# Patient Record
Sex: FEMALE
[De-identification: names, ages, dates, MRNs, and addresses within clinical notes are randomized per-mention and may not be internally consistent; named-entity substitution may affect disease eponyms.]

---

## 2019-04-29 ENCOUNTER — HOSPITAL ENCOUNTER (OUTPATIENT)
Dept: HOSPITAL 53 - M RAD | Age: 24
End: 2019-04-29
Attending: ADVANCED PRACTICE MIDWIFE
Payer: COMMERCIAL

## 2019-04-29 DIAGNOSIS — Z36.89: ICD-10-CM

## 2019-04-29 DIAGNOSIS — Z3A.21: ICD-10-CM

## 2019-04-29 DIAGNOSIS — Z34.82: Primary | ICD-10-CM

## 2019-04-30 NOTE — REP
Clinical:  Anatomical evaluation.

 

Comparison: None.

 

Findings:

Examination demonstrates a single live intrauterine pregnancy in variable

presentation.  Fetal motion is identified by technologist.  Placenta is noted

right fundal and grade grade zero without evidence for placenta previa or

abruption.  Amniotic fluid volume is normal.  Cervix measures 3.4 cm in length

and appears closed.  No evidence for nuchal cord.

 

Gestational age by LMP 21 weeks 2 days with [facility] 09/07/2019 .

Gestational age by current measurements 21 weeks 6 days with [facility] 09/03/2019 .

 

FHR equals 150 beats per minute.

BPD  5.4 cm     22 weeks 4 days

HC  19.5 cm     21 weeks 5 days

AC  17.2 cm     22 weeks 1 day

FL  3.5 cm      21 weeks 0 days

HL  3.5 cm      21 weeks 6 days

HC/AC ratio  1.13

 

Estimated fetal weight 442 grams (58th percentile).

 

Anatomical assessment demonstrates normal structures including cranium, choroid

plexus, cavum, cerebellum/posterior fossa, facial features, lungs, four-chamber

heart/ventricular outflow tracts, diaphragm, stomach, cord insertion/three-vessel

cord, kidneys/bladder, spine, and extremities.

 

Impression:

Single live intrauterine pregnancy in variable presentation demonstrating

appropriate interval growth.  Anatomical assessment is complete and normal.  No

gross abnormalities are identified.

 

 

Electronically Signed by

Axel Bain MD 04/30/2019 02:42 A

## 2019-06-05 ENCOUNTER — HOSPITAL ENCOUNTER (OUTPATIENT)
Dept: HOSPITAL 53 - M LAB REF | Age: 24
End: 2019-06-05
Attending: ADVANCED PRACTICE MIDWIFE
Payer: COMMERCIAL

## 2019-06-05 DIAGNOSIS — Z34.82: Primary | ICD-10-CM

## 2019-06-21 ENCOUNTER — HOSPITAL ENCOUNTER (OUTPATIENT)
Dept: HOSPITAL 53 - M SMT | Age: 24
End: 2019-06-21
Attending: ADVANCED PRACTICE MIDWIFE
Payer: COMMERCIAL

## 2019-06-21 DIAGNOSIS — O99.89: Primary | ICD-10-CM

## 2019-06-21 DIAGNOSIS — Z3A.26: ICD-10-CM

## 2019-06-21 LAB
HCT VFR BLD AUTO: 34.7 % (ref 36–47)
HGB BLD-MCNC: 11.5 G/DL (ref 12–15.5)
MCH RBC QN AUTO: 30.1 PG (ref 27–33)
MCHC RBC AUTO-ENTMCNC: 33.1 G/DL (ref 32–36.5)
MCV RBC AUTO: 90.8 FL (ref 80–96)
PLATELET # BLD AUTO: 298 10^3/UL (ref 150–450)
RBC # BLD AUTO: 3.82 10^6/UL (ref 4–5.4)
WBC # BLD AUTO: 7.8 10^3/UL (ref 4–10)

## 2019-06-27 ENCOUNTER — HOSPITAL ENCOUNTER (OUTPATIENT)
Dept: HOSPITAL 53 - M LAB REF | Age: 24
End: 2019-06-27
Attending: ADVANCED PRACTICE MIDWIFE
Payer: COMMERCIAL

## 2019-06-27 DIAGNOSIS — Z3A.00: ICD-10-CM

## 2019-06-27 DIAGNOSIS — Z34.82: Primary | ICD-10-CM

## 2019-07-03 ENCOUNTER — HOSPITAL ENCOUNTER (OUTPATIENT)
Dept: HOSPITAL 53 - M RAD | Age: 24
End: 2019-07-03
Attending: ADVANCED PRACTICE MIDWIFE
Payer: COMMERCIAL

## 2019-07-03 DIAGNOSIS — O26.843: Primary | ICD-10-CM

## 2019-07-03 DIAGNOSIS — Z3A.33: ICD-10-CM

## 2019-07-03 NOTE — REP
Clinical:  Growth evaluation.

 

Comparison: 04/29/2019 .

 

Findings:

Examination demonstrates a single live intrauterine pregnancy in cephalic

presentation.  Fetal motion is identified by technologist.  Placenta is noted

anterior and grade one without evidence for placenta previa or abruption.

Amniotic fluid volume is elevated.  Cervix measures 3.2 cm in length and appears

closed.  No evidence for nuchal cord.

 

Gestational age by LMP 30 weeks 4 days with [facility] 09/07/2019 .

Gestational age by current measurements 33 weeks 6 days with [facility] 08/15/2019 .

 

FHR equals 145 beats per minute.

BPD  8.7 cm     35 weeks 1 day

HC  30.8 cm     34 weeks 2 days

AC  30.4 cm     34 weeks 2 days

FL  6.3 cm      32 weeks 3 days

HL  5.6 cm      32 weeks 5 days

HC/AC ratio  1.01

 

Estimated fetal weight 2290 grams ( greater than 97 percentile ).

Amniotic fluid index:  30.6 cm (8.9 - 23.6)

Umbilical cord SD ratio:  2.50 (2.50 - 3.50).

 

Limited anatomical assessment demonstrates normal cavum, facial features, lungs,

four-chamber heart, diaphragm, stomach, cord insertion/three-vessel cord,

kidneys/bladder and spine.

 

Impression:

1.  Based on age by LMP, there has been greater than expected interval growth

with the estimated fetal weight greater than 97 percentile.

2.  Amniotic fluid volume is above normal range suggesting polyhydramnios.

 

 

Electronically Signed by

Axel Bain MD 07/03/2019 04:48 P

## 2019-07-11 ENCOUNTER — HOSPITAL ENCOUNTER (OUTPATIENT)
Dept: HOSPITAL 53 - M SMT | Age: 24
End: 2019-07-11
Attending: ADVANCED PRACTICE MIDWIFE
Payer: COMMERCIAL

## 2019-07-11 DIAGNOSIS — O16.3: Primary | ICD-10-CM

## 2019-07-11 DIAGNOSIS — Z3A.00: ICD-10-CM

## 2019-07-11 LAB
ALT SERPL W P-5'-P-CCNC: 26 U/L (ref 12–78)
BILIRUB SERPL-MCNC: 0.4 MG/DL (ref 0.2–1)
CREAT SERPL-MCNC: 0.47 MG/DL (ref 0.55–1.3)
CREAT UR-MCNC: 83.3 MG/DL
GFR SERPL CREATININE-BSD FRML MDRD: > 60 ML/MIN/{1.73_M2} (ref 60–?)
HCT VFR BLD AUTO: 36.4 % (ref 36–47)
HGB BLD-MCNC: 11.9 G/DL (ref 12–15.5)
LDH SERPL L TO P-CCNC: 156 U/L (ref 84–246)
MCH RBC QN AUTO: 29.5 PG (ref 27–33)
MCHC RBC AUTO-ENTMCNC: 32.7 G/DL (ref 32–36.5)
MCV RBC AUTO: 90.1 FL (ref 80–96)
PLATELET # BLD AUTO: 276 10^3/UL (ref 150–450)
PROT UR-MCNC: 18.3 MG/DL (ref 0–12)
RBC # BLD AUTO: 4.04 10^6/UL (ref 4–5.4)
URATE SERPL-MCNC: 2.6 MG/DL (ref 2.6–6)
WBC # BLD AUTO: 8.8 10^3/UL (ref 4–10)

## 2019-07-18 ENCOUNTER — HOSPITAL ENCOUNTER (OUTPATIENT)
Dept: HOSPITAL 53 - M RAD | Age: 24
End: 2019-07-18
Attending: ADVANCED PRACTICE MIDWIFE
Payer: COMMERCIAL

## 2019-07-18 DIAGNOSIS — O13.3: Primary | ICD-10-CM

## 2019-07-18 NOTE — REP
OB ULTRASOUND, BIOPHYSICAL PROFILE:

 

Real-time sonographic evaluation of the gravid uterus is performed.

 

There is a single living intrauterine gestation.  The estimated gestational age

is 32 weeks 5 days. EDC 09/07/2019. Cervix is closed and measures 3.3 cm in

length.  Fetal heart rate 136 beats per minute.  Amniotic fluid subjectively is

upper limits of normal. ROSALVA 25.3 which is slightly above normal range of 8.4 to

24.4. Biophysical profile score 8/8. SD ratio 2.36 within normal range of 2.15 to

3.15. RI 0.58 is slightly below normal range of 0.59 to 0.75. Fetal position is

vertex. Placenta is anterior an fundal, grade 1 with no previa or abruption.

 

 

Electronically Signed by

Camron Araya MD 07/21/2019 07:20 P

## 2019-07-20 ENCOUNTER — HOSPITAL ENCOUNTER (OUTPATIENT)
Dept: HOSPITAL 53 - M LDO | Age: 24
Discharge: HOME | End: 2019-07-20
Attending: ADVANCED PRACTICE MIDWIFE
Payer: COMMERCIAL

## 2019-07-20 VITALS — HEIGHT: 66 IN | BODY MASS INDEX: 34.51 KG/M2 | WEIGHT: 214.73 LBS

## 2019-07-20 VITALS — SYSTOLIC BLOOD PRESSURE: 112 MMHG | DIASTOLIC BLOOD PRESSURE: 58 MMHG

## 2019-07-20 VITALS — SYSTOLIC BLOOD PRESSURE: 108 MMHG | DIASTOLIC BLOOD PRESSURE: 62 MMHG

## 2019-07-20 VITALS — SYSTOLIC BLOOD PRESSURE: 118 MMHG | DIASTOLIC BLOOD PRESSURE: 56 MMHG

## 2019-07-20 VITALS — DIASTOLIC BLOOD PRESSURE: 67 MMHG | SYSTOLIC BLOOD PRESSURE: 108 MMHG

## 2019-07-20 DIAGNOSIS — R51: ICD-10-CM

## 2019-07-20 DIAGNOSIS — Z3A.33: ICD-10-CM

## 2019-07-20 DIAGNOSIS — O99.89: Primary | ICD-10-CM

## 2019-07-20 LAB
ALT SERPL W P-5'-P-CCNC: 21 U/L (ref 12–78)
BILIRUB SERPL-MCNC: 0.3 MG/DL (ref 0.2–1)
CREAT SERPL-MCNC: 0.49 MG/DL (ref 0.55–1.3)
CREAT UR-MCNC: 35.5 MG/DL
GFR SERPL CREATININE-BSD FRML MDRD: > 60 ML/MIN/{1.73_M2} (ref 60–?)
HCT VFR BLD AUTO: 31.3 % (ref 36–47)
HGB BLD-MCNC: 10.7 G/DL (ref 12–15.5)
LDH SERPL L TO P-CCNC: 160 U/L (ref 84–246)
MCH RBC QN AUTO: 30.1 PG (ref 27–33)
MCHC RBC AUTO-ENTMCNC: 34.2 G/DL (ref 32–36.5)
MCV RBC AUTO: 87.9 FL (ref 80–96)
PLATELET # BLD AUTO: 238 10^3/UL (ref 150–450)
PROT UR-MCNC: 6.2 MG/DL (ref 0–12)
RBC # BLD AUTO: 3.56 10^6/UL (ref 4–5.4)
URATE SERPL-MCNC: 3.2 MG/DL (ref 2.6–6)
WBC # BLD AUTO: 7.2 10^3/UL (ref 4–10)

## 2019-07-20 PROCEDURE — 82570 ASSAY OF URINE CREATININE: CPT

## 2019-07-20 PROCEDURE — 85027 COMPLETE CBC AUTOMATED: CPT

## 2019-07-20 PROCEDURE — 82247 BILIRUBIN TOTAL: CPT

## 2019-07-20 PROCEDURE — 84450 TRANSFERASE (AST) (SGOT): CPT

## 2019-07-20 PROCEDURE — 36415 COLL VENOUS BLD VENIPUNCTURE: CPT

## 2019-07-20 PROCEDURE — 82565 ASSAY OF CREATININE: CPT

## 2019-07-20 PROCEDURE — 84550 ASSAY OF BLOOD/URIC ACID: CPT

## 2019-07-20 PROCEDURE — 84156 ASSAY OF PROTEIN URINE: CPT

## 2019-07-20 PROCEDURE — 83615 LACTATE (LD) (LDH) ENZYME: CPT

## 2019-07-20 PROCEDURE — 84460 ALANINE AMINO (ALT) (SGPT): CPT

## 2019-07-20 PROCEDURE — 59025 FETAL NON-STRESS TEST: CPT

## 2019-07-20 NOTE — IPNPDOC
Text Note


Date of Service


The patient was seen on 19.





NOTE


24yo  [facility] 19. Presents @ 33wks with reports of headache. State is 

unilateral, Right sided over her eye and radiates into her neck. Denies visual 

disturbances, CP. Reports taking tylenol in the afternoon and again around 0100 

without relief. 





History is significant for GHTN


Resting in bed, NAD


Cat I fetal tracing


/58. Pressure yesterday in the office 128/68





Will repeat preeclamptic panel


Fioricet ordered.





VS,Fishbone, I+O


VS, Fishbone, I+O





Vital Signs








  Date Time  Temp Pulse Resp B/P (MAP) Pulse Ox O2 Delivery O2 Flow Rate FiO2


 


19 03:58 98.2 81 18 112/58 (76)    

















Harriet Milian CNM   2019 04:39

## 2019-07-23 ENCOUNTER — HOSPITAL ENCOUNTER (OUTPATIENT)
Dept: HOSPITAL 53 - M LDO | Age: 24
Discharge: HOME | End: 2019-07-23
Attending: OBSTETRICS & GYNECOLOGY
Payer: COMMERCIAL

## 2019-07-23 VITALS — DIASTOLIC BLOOD PRESSURE: 76 MMHG | SYSTOLIC BLOOD PRESSURE: 134 MMHG

## 2019-07-23 VITALS — DIASTOLIC BLOOD PRESSURE: 76 MMHG | SYSTOLIC BLOOD PRESSURE: 126 MMHG

## 2019-07-23 VITALS — SYSTOLIC BLOOD PRESSURE: 115 MMHG | DIASTOLIC BLOOD PRESSURE: 68 MMHG

## 2019-07-23 VITALS — DIASTOLIC BLOOD PRESSURE: 75 MMHG | SYSTOLIC BLOOD PRESSURE: 125 MMHG

## 2019-07-23 VITALS — SYSTOLIC BLOOD PRESSURE: 119 MMHG | DIASTOLIC BLOOD PRESSURE: 71 MMHG

## 2019-07-23 VITALS — DIASTOLIC BLOOD PRESSURE: 68 MMHG | SYSTOLIC BLOOD PRESSURE: 116 MMHG

## 2019-07-23 VITALS — SYSTOLIC BLOOD PRESSURE: 129 MMHG | DIASTOLIC BLOOD PRESSURE: 79 MMHG

## 2019-07-23 VITALS — BODY MASS INDEX: 34.9 KG/M2 | WEIGHT: 217.16 LBS | HEIGHT: 66 IN

## 2019-07-23 VITALS — SYSTOLIC BLOOD PRESSURE: 75 MMHG

## 2019-07-23 DIAGNOSIS — O26.893: Primary | ICD-10-CM

## 2019-07-23 DIAGNOSIS — R03.0: ICD-10-CM

## 2019-07-23 DIAGNOSIS — Z3A.33: ICD-10-CM

## 2019-07-23 PROCEDURE — 59025 FETAL NON-STRESS TEST: CPT

## 2019-07-25 ENCOUNTER — HOSPITAL ENCOUNTER (OUTPATIENT)
Dept: HOSPITAL 53 - M RAD | Age: 24
End: 2019-07-25
Attending: ADVANCED PRACTICE MIDWIFE
Payer: COMMERCIAL

## 2019-07-25 DIAGNOSIS — O40.3XX0: Primary | ICD-10-CM

## 2019-07-25 DIAGNOSIS — Z3A.33: ICD-10-CM

## 2019-07-29 NOTE — REP
Clinical:  Fetal well-being.  Polyhydramnios.

 

Comparison: 07/18/2019 .

 

Findings:

Examination demonstrates a single live intrauterine pregnancy in cephalic

presentation.  Fetal motion is identified by technologist.  Placenta is noted

anterior/fundal and grade one without evidence for placenta previa or abruption.

 

 

Gestational age by LMP 33 weeks 5 days with [facility] 09/07/2019 .

FHR equals 133 beats per minute.

Amniotic fluid index:  19.8 cm (8.2 - 24.7)

Biophysical profile score: 8/8

Umbilical cord SD ratio:  1.98

 

 

 

Impression:

1.  Single live intrauterine pregnancy in cephalic presentation.

2.  Biophysical profile score is normal.

3.  Amniotic fluid volume is within normal range.

 

 

Electronically Signed by

Axel Bain MD 07/29/2019 01:00 P

## 2019-08-01 ENCOUNTER — HOSPITAL ENCOUNTER (OUTPATIENT)
Dept: HOSPITAL 53 - M RAD | Age: 24
End: 2019-08-01
Attending: ADVANCED PRACTICE MIDWIFE
Payer: COMMERCIAL

## 2019-08-01 DIAGNOSIS — O40.3XX0: Primary | ICD-10-CM

## 2019-08-01 NOTE — REP
Clinical:  Fetal well-being

 

Comparison: 07/25/2019 .

 

Findings:

Examination demonstrates a single live intrauterine pregnancy in cephalic

presentation.  Fetal motion is identified by technologist.  Placenta is noted

anterior/fundal and grade II without evidence for placenta previa or abruption.

Amniotic fluid volume is normal.  Cervix measures 2.7 cm in length and appears

closed.  No evidence for nuchal cord.

 

Gestational age by LMP 34 weeks 5 days with [facility] 09/07/2019 .

FHR equals 158 beats per minute.

Biophysical profile score:  8/8

Amniotic fluid index:  18.6 cm (8.0 - 24.9)

 

 

 

 

 

Impression:

Single live advanced gestation in cephalic presentation.

Biophysical profile score and amniotic fluid volume are within normal limits.

 

 

Electronically Signed by

Axel Bain MD 08/01/2019 12:36 P

## 2019-08-07 ENCOUNTER — HOSPITAL ENCOUNTER (OUTPATIENT)
Dept: HOSPITAL 53 - M RAD | Age: 24
End: 2019-08-07
Attending: ADVANCED PRACTICE MIDWIFE
Payer: COMMERCIAL

## 2019-08-07 DIAGNOSIS — Z3A.37: ICD-10-CM

## 2019-08-07 DIAGNOSIS — O13.3: Primary | ICD-10-CM

## 2019-08-08 ENCOUNTER — HOSPITAL ENCOUNTER (OUTPATIENT)
Dept: HOSPITAL 53 - M SMT | Age: 24
End: 2019-08-08
Attending: ADVANCED PRACTICE MIDWIFE
Payer: COMMERCIAL

## 2019-08-08 ENCOUNTER — HOSPITAL ENCOUNTER (OUTPATIENT)
Dept: HOSPITAL 53 - M LAB REF | Age: 24
End: 2019-08-08
Attending: ADVANCED PRACTICE MIDWIFE
Payer: COMMERCIAL

## 2019-08-08 DIAGNOSIS — O13.3: Primary | ICD-10-CM

## 2019-08-14 ENCOUNTER — HOSPITAL ENCOUNTER (OUTPATIENT)
Dept: HOSPITAL 53 - M RAD | Age: 24
End: 2019-08-14
Attending: ADVANCED PRACTICE MIDWIFE
Payer: COMMERCIAL

## 2019-08-14 DIAGNOSIS — Z3A.00: ICD-10-CM

## 2019-08-14 DIAGNOSIS — O40.3XX0: Primary | ICD-10-CM

## 2019-08-14 NOTE — REP
OBSTETRIC SONOGRAPHY:  LIMITED EXAM.

 

HISTORY :  Weekly biophysical profile.

 

FINDINGS:  Scanning demonstrates a viable single intrauterine gestation in a

cephalic fetal lie.  An anterior fundal placenta is seen without evidence of

previa or abruption, grade 2-3 changes.  Umbilical cord is seen draping across

fetal shoulders.

 

Closed cervical length is measured transabdominally at 4.1 cm.  Fetal heart rate

is recorded at 152 beats per minute.  Amniotic fluid is subjectively normal.  ROSALVA

is normal at 15.6 cm.  Biophysical profile score is 8 out of a possible 8.  S/D

ratio in the umbilical cord artery by Doppler is normal 1.90.

 

 

Electronically Signed by

Jose Lowe MD 08/14/2019 09:03 P

## 2019-08-21 ENCOUNTER — HOSPITAL ENCOUNTER (OUTPATIENT)
Dept: HOSPITAL 53 - M RAD | Age: 24
End: 2019-08-21
Attending: ADVANCED PRACTICE MIDWIFE
Payer: COMMERCIAL

## 2019-08-21 DIAGNOSIS — Z3A.38: ICD-10-CM

## 2019-08-21 DIAGNOSIS — O40.3XX0: Primary | ICD-10-CM

## 2019-08-21 NOTE — REP
Third trimester obstetric ultrasound for fetal biophysical profile:

There is a single intrauterine gestation in a vertex presentation.

The fetal heart rate is 152 beats per minute.

Gestational age by the first ultrasound is 38 weeks 1 day/[facility] 09/03/2019.

Gestational age by LMP is 37 weeks 4 days/[facility] 09/07/2019.

The placenta is fundal without previa or abruptio and demonstrates grade 3

maturity.

 

The biophysical profile:

Breathing         2.0

Movement    2.0

Tone         2.0

AFV         2.0

 

Total 8.0 / 8.0.

 

Umbilical artery Doppler assessment:

S/D ratio         1.9 (2.30-3.30)

 

 

 

 

Electronically Signed by

Camron Kerns MD 08/21/2019 12:18 P

## 2019-08-22 ENCOUNTER — HOSPITAL ENCOUNTER (INPATIENT)
Dept: HOSPITAL 53 - M LDI | Age: 24
LOS: 2 days | Discharge: HOME | End: 2019-08-24
Payer: COMMERCIAL

## 2019-08-22 VITALS — DIASTOLIC BLOOD PRESSURE: 89 MMHG | SYSTOLIC BLOOD PRESSURE: 144 MMHG

## 2019-08-22 VITALS — DIASTOLIC BLOOD PRESSURE: 85 MMHG | SYSTOLIC BLOOD PRESSURE: 138 MMHG

## 2019-08-22 VITALS — DIASTOLIC BLOOD PRESSURE: 84 MMHG | SYSTOLIC BLOOD PRESSURE: 133 MMHG

## 2019-08-22 VITALS — DIASTOLIC BLOOD PRESSURE: 70 MMHG | SYSTOLIC BLOOD PRESSURE: 134 MMHG

## 2019-08-22 VITALS — SYSTOLIC BLOOD PRESSURE: 119 MMHG | DIASTOLIC BLOOD PRESSURE: 71 MMHG

## 2019-08-22 VITALS — SYSTOLIC BLOOD PRESSURE: 137 MMHG | DIASTOLIC BLOOD PRESSURE: 67 MMHG

## 2019-08-22 VITALS — DIASTOLIC BLOOD PRESSURE: 77 MMHG | SYSTOLIC BLOOD PRESSURE: 133 MMHG

## 2019-08-22 VITALS — DIASTOLIC BLOOD PRESSURE: 80 MMHG | SYSTOLIC BLOOD PRESSURE: 137 MMHG

## 2019-08-22 VITALS — DIASTOLIC BLOOD PRESSURE: 66 MMHG | SYSTOLIC BLOOD PRESSURE: 129 MMHG

## 2019-08-22 VITALS — DIASTOLIC BLOOD PRESSURE: 75 MMHG | SYSTOLIC BLOOD PRESSURE: 132 MMHG

## 2019-08-22 VITALS — SYSTOLIC BLOOD PRESSURE: 153 MMHG | DIASTOLIC BLOOD PRESSURE: 82 MMHG

## 2019-08-22 VITALS — SYSTOLIC BLOOD PRESSURE: 140 MMHG | DIASTOLIC BLOOD PRESSURE: 88 MMHG

## 2019-08-22 VITALS — SYSTOLIC BLOOD PRESSURE: 135 MMHG | DIASTOLIC BLOOD PRESSURE: 76 MMHG

## 2019-08-22 VITALS — DIASTOLIC BLOOD PRESSURE: 81 MMHG | SYSTOLIC BLOOD PRESSURE: 129 MMHG

## 2019-08-22 VITALS — HEIGHT: 66 IN | BODY MASS INDEX: 35.32 KG/M2 | WEIGHT: 219.8 LBS

## 2019-08-22 VITALS — DIASTOLIC BLOOD PRESSURE: 67 MMHG | SYSTOLIC BLOOD PRESSURE: 120 MMHG

## 2019-08-22 VITALS — SYSTOLIC BLOOD PRESSURE: 164 MMHG | DIASTOLIC BLOOD PRESSURE: 87 MMHG

## 2019-08-22 VITALS — SYSTOLIC BLOOD PRESSURE: 149 MMHG | DIASTOLIC BLOOD PRESSURE: 74 MMHG

## 2019-08-22 VITALS — DIASTOLIC BLOOD PRESSURE: 89 MMHG | SYSTOLIC BLOOD PRESSURE: 132 MMHG

## 2019-08-22 VITALS — SYSTOLIC BLOOD PRESSURE: 169 MMHG | DIASTOLIC BLOOD PRESSURE: 91 MMHG

## 2019-08-22 VITALS — DIASTOLIC BLOOD PRESSURE: 88 MMHG | SYSTOLIC BLOOD PRESSURE: 130 MMHG

## 2019-08-22 VITALS — SYSTOLIC BLOOD PRESSURE: 128 MMHG | DIASTOLIC BLOOD PRESSURE: 78 MMHG

## 2019-08-22 VITALS — DIASTOLIC BLOOD PRESSURE: 63 MMHG | SYSTOLIC BLOOD PRESSURE: 123 MMHG

## 2019-08-22 VITALS — DIASTOLIC BLOOD PRESSURE: 56 MMHG | SYSTOLIC BLOOD PRESSURE: 120 MMHG

## 2019-08-22 VITALS — DIASTOLIC BLOOD PRESSURE: 76 MMHG | SYSTOLIC BLOOD PRESSURE: 129 MMHG

## 2019-08-22 VITALS — DIASTOLIC BLOOD PRESSURE: 74 MMHG | SYSTOLIC BLOOD PRESSURE: 126 MMHG

## 2019-08-22 DIAGNOSIS — O40.3XX0: ICD-10-CM

## 2019-08-22 DIAGNOSIS — Z3A.37: ICD-10-CM

## 2019-08-22 LAB
HCT VFR BLD AUTO: 30.9 % (ref 36–47)
HGB BLD-MCNC: 10.3 G/DL (ref 12–15.5)
MCH RBC QN AUTO: 28.4 PG (ref 27–33)
MCHC RBC AUTO-ENTMCNC: 33.3 G/DL (ref 32–36.5)
MCV RBC AUTO: 85.1 FL (ref 80–96)
PLATELET # BLD AUTO: 195 10^3/UL (ref 150–450)
RBC # BLD AUTO: 3.63 10^6/UL (ref 4–5.4)
WBC # BLD AUTO: 8 10^3/UL (ref 4–10)

## 2019-08-22 PROCEDURE — 3E0P7GC INTRODUCTION OF OTHER THERAPEUTIC SUBSTANCE INTO FEMALE REPRODUCTIVE, VIA NATURAL OR ARTIFICIAL OPENING: ICD-10-PCS

## 2019-08-22 RX ADMIN — MISOPROSTOL SCH MCG: 100 TABLET ORAL at 12:23

## 2019-08-22 RX ADMIN — CALCIUM CARBONATE (ANTACID) CHEW TAB 500 MG PRN MG: 500 CHEW TAB at 11:07

## 2019-08-22 RX ADMIN — CALCIUM CARBONATE (ANTACID) CHEW TAB 500 MG PRN MG: 500 CHEW TAB at 16:52

## 2019-08-22 RX ADMIN — MISOPROSTOL SCH MCG: 100 TABLET ORAL at 08:16

## 2019-08-22 NOTE — HPE
DATE OF ADMISSION:  2019

 

23-year-old,  2, para 1 female at 37 and 5/7 weeks gestation by last

menstrual period plus ultrasound, estimated date of confinement (EDC) of

2019, presents for labor induction.  The indication for delivery less than

39 weeks is gestational hypertension.  She is not currently requiring medication

for blood pressure.

 

PRENATAL COURSE:  The patient initiated prenatal care at 10 weeks gestation on

2019.  Her initial blood pressure was 134/78, weight 207 pounds.  She had

intermittently mildly elevated pressures during pregnancy, including blood

pressure 150/90 at 32 weeks gestation. She did not require medication during

pregnancy.  She has polyhydramnios and suspected large for gestational age infant

on ultrasound evaluation.

 

OBSTETRICAL HISTORY:

1.  In 2016, 39 week vaginal delivery of 8 pounds 13 ounce female with no

complications.

 

SURGICAL HISTORY:  None.

 

MEDICAL HISTORY:  Hypertension.

 

ALLERGIES:  None.

 

SOCIAL HISTORY:   The patient is .  She denies cigarettes, alcohol or drug

use.

 

FAMILY HISTORY:   Noncontributory.

 

PHYSICAL EXAMINATION:

Blood pressure 134/80, pulse 84.  She is in no apparent distress.

Head and neck exam normal.

LUNGS:  Clear.

HEART:  Regular.

ABDOMEN:  Nontender.  Gravid.  Fetal heart tones category 1.

Sterile vaginal examination:  1 to 2 cm, 50%, -2, posterior, soft, vertex.

Contractions irregular.

EXTREMITIES:  Nontender.

 

LABORATORIES:

Blood type A positive, Rubella immune, RPR nonreactive, hepatitis B and C

negative, HIV negative.  Diabetes screen 96.

 

ASSESSMENT:

23-year-old  2, para 1 female at 37 and 5 with chronic hypertension who

presents for labor induction.

 

PLAN:  The patient was admitted on 2019. Risks of induction were discussed.

## 2019-08-23 VITALS — DIASTOLIC BLOOD PRESSURE: 76 MMHG | SYSTOLIC BLOOD PRESSURE: 127 MMHG

## 2019-08-23 VITALS — SYSTOLIC BLOOD PRESSURE: 135 MMHG | DIASTOLIC BLOOD PRESSURE: 73 MMHG

## 2019-08-23 VITALS — DIASTOLIC BLOOD PRESSURE: 92 MMHG | SYSTOLIC BLOOD PRESSURE: 143 MMHG

## 2019-08-23 RX ADMIN — SODIUM CHLORIDE, PRESERVATIVE FREE SCH ML: 5 INJECTION INTRAVENOUS at 13:23

## 2019-08-23 RX ADMIN — SODIUM CHLORIDE, PRESERVATIVE FREE SCH ML: 5 INJECTION INTRAVENOUS at 22:00

## 2019-08-23 RX ADMIN — Medication SCH TAB: at 08:18

## 2019-08-23 NOTE — DN
DATE:  2019

 

PREDELIVERY DIAGNOSIS:  39 weeks, labor induction.

 

POSTDELIVERY DIAGNOSIS:  Delivered.

 

PROCEDURE:  Spontaneous vaginal delivery.

 

OBSTETRICIAN:  Dr. Arpan Prieto

 

ANESTHESIA:  Epidural.

 

ESTIMATED BLOOD LOSS:

 

FINDINGS:  7 pounds 15 ounce male.  Apgar scores 8 and 9.

 

DELIVERY SUMMARY:  After a short second stage of 10 minutes, the patient

spontaneously delivered a 7 pound 15 ounce male with Apgar scores of 8 and 9

under epidural anesthesia.  There was no nuchal cord.  The shoulders delivered

with ease.  The infant cried immediately and was handed to the mother.  The cord

was doubly clamped and cut.  The placenta delivered spontaneously and appeared to

be intact.  The patient received IV Pitocin immediately after delivery of the

placenta.  A first degree perineal laceration did not necessitate any repair.

Sponge counts were correct.

## 2019-08-24 VITALS — SYSTOLIC BLOOD PRESSURE: 126 MMHG | DIASTOLIC BLOOD PRESSURE: 67 MMHG

## 2019-08-24 RX ADMIN — SODIUM CHLORIDE, PRESERVATIVE FREE SCH ML: 5 INJECTION INTRAVENOUS at 06:00

## 2019-08-24 RX ADMIN — Medication SCH TAB: at 08:37

## 2019-11-18 ENCOUNTER — HOSPITAL ENCOUNTER (OUTPATIENT)
Dept: HOSPITAL 53 - M LAB REF | Age: 24
End: 2019-11-18
Payer: COMMERCIAL

## 2019-11-18 DIAGNOSIS — Z12.4: Primary | ICD-10-CM

## 2024-04-03 NOTE — REP
Clinical:  Fetal well-being

 

Comparison: 08/01/2019 .

 

Findings:

Examination demonstrates a single live intrauterine pregnancy in breech

presentation.  Fetal motion is identified by technologist.  Placenta is noted

anterior fundal and grade II without evidence for placenta previa or abruption.

Amniotic fluid volume is normal.  Cervix measures 4.0 cm in length and appears

closed.  No evidence for nuchal cord.

 

Gestational age by LMP 35 weeks 4 days with [facility] 09/07/2019 .

Gestational age by current measurements 37 weeks 6 days with [facility] 08/22/2019 .

 

FHR equals 161 beats per minute.

Estimated fetal weight 3443 grams (> 97% based on age by LMP).

 

Biophysical profile score: 8/8

Amniotic fluid index:  17.6 cm

Umbilical cord SD ratio:  1.87 (2.00 - 3.00).

 

Impression:

1.   Single live intrauterine pregnancy in breech presentation with greater than

expected interval growth based on age by LMP.

2.   Biophysical profile score and amniotic fluid volume are normal.  Umbilical

cord SD ratio slightly decreased.

 

 

Electronically Signed by

Axel Bain MD 08/08/2019 05:59 A
PT/PTT/Type and Screen
BMP/CBC/CMP/PT/PTT/Type and Screen/Urinalysis/EKG/CXR